# Patient Record
Sex: MALE | Race: WHITE | NOT HISPANIC OR LATINO | Employment: OTHER | URBAN - METROPOLITAN AREA
[De-identification: names, ages, dates, MRNs, and addresses within clinical notes are randomized per-mention and may not be internally consistent; named-entity substitution may affect disease eponyms.]

---

## 2017-04-01 ENCOUNTER — ALLSCRIPTS OFFICE VISIT (OUTPATIENT)
Dept: OTHER | Facility: OTHER | Age: 62
End: 2017-04-01

## 2017-04-01 DIAGNOSIS — Z12.11 ENCOUNTER FOR SCREENING FOR MALIGNANT NEOPLASM OF COLON: ICD-10-CM

## 2017-12-05 ENCOUNTER — LAB REQUISITION (OUTPATIENT)
Dept: LAB | Facility: HOSPITAL | Age: 62
End: 2017-12-05
Payer: COMMERCIAL

## 2017-12-05 DIAGNOSIS — R07.9 CHEST PAIN: ICD-10-CM

## 2017-12-05 DIAGNOSIS — E55.9 VITAMIN D DEFICIENCY: ICD-10-CM

## 2017-12-05 DIAGNOSIS — E78.5 HYPERLIPIDEMIA: ICD-10-CM

## 2017-12-05 DIAGNOSIS — Z12.5 ENCOUNTER FOR SCREENING FOR MALIGNANT NEOPLASM OF PROSTATE: ICD-10-CM

## 2017-12-05 LAB
25(OH)D3 SERPL-MCNC: 14.9 NG/ML (ref 30–100)
ALBUMIN SERPL BCP-MCNC: 3.8 G/DL (ref 3.5–5)
ALP SERPL-CCNC: 93 U/L (ref 46–116)
ALT SERPL W P-5'-P-CCNC: 19 U/L (ref 12–78)
ANION GAP SERPL CALCULATED.3IONS-SCNC: 4 MMOL/L (ref 4–13)
AST SERPL W P-5'-P-CCNC: 10 U/L (ref 5–45)
BASOPHILS # BLD AUTO: 0.02 THOUSANDS/ΜL (ref 0–0.1)
BASOPHILS NFR BLD AUTO: 1 % (ref 0–1)
BILIRUB SERPL-MCNC: 0.49 MG/DL (ref 0.2–1)
BUN SERPL-MCNC: 18 MG/DL (ref 5–25)
CALCIUM SERPL-MCNC: 8.9 MG/DL (ref 8.3–10.1)
CHLORIDE SERPL-SCNC: 106 MMOL/L (ref 100–108)
CHOLEST SERPL-MCNC: 240 MG/DL (ref 50–200)
CO2 SERPL-SCNC: 29 MMOL/L (ref 21–32)
CREAT SERPL-MCNC: 1.14 MG/DL (ref 0.6–1.3)
EOSINOPHIL # BLD AUTO: 0.08 THOUSAND/ΜL (ref 0–0.61)
EOSINOPHIL NFR BLD AUTO: 2 % (ref 0–6)
ERYTHROCYTE [DISTWIDTH] IN BLOOD BY AUTOMATED COUNT: 13.2 % (ref 11.6–15.1)
GFR SERPL CREATININE-BSD FRML MDRD: 68 ML/MIN/1.73SQ M
GLUCOSE P FAST SERPL-MCNC: 99 MG/DL (ref 65–99)
HCT VFR BLD AUTO: 44.6 % (ref 36.5–49.3)
HDLC SERPL-MCNC: 49 MG/DL (ref 40–60)
HGB BLD-MCNC: 15.5 G/DL (ref 12–17)
LDLC SERPL CALC-MCNC: 149 MG/DL (ref 0–100)
LYMPHOCYTES # BLD AUTO: 1.28 THOUSANDS/ΜL (ref 0.6–4.47)
LYMPHOCYTES NFR BLD AUTO: 31 % (ref 14–44)
MCH RBC QN AUTO: 32 PG (ref 26.8–34.3)
MCHC RBC AUTO-ENTMCNC: 34.8 G/DL (ref 31.4–37.4)
MCV RBC AUTO: 92 FL (ref 82–98)
MONOCYTES # BLD AUTO: 0.37 THOUSAND/ΜL (ref 0.17–1.22)
MONOCYTES NFR BLD AUTO: 9 % (ref 4–12)
NEUTROPHILS # BLD AUTO: 2.36 THOUSANDS/ΜL (ref 1.85–7.62)
NEUTS SEG NFR BLD AUTO: 57 % (ref 43–75)
NRBC BLD AUTO-RTO: 0 /100 WBCS
PLATELET # BLD AUTO: 204 THOUSANDS/UL (ref 149–390)
PMV BLD AUTO: 10.5 FL (ref 8.9–12.7)
POTASSIUM SERPL-SCNC: 4.6 MMOL/L (ref 3.5–5.3)
PROT SERPL-MCNC: 7 G/DL (ref 6.4–8.2)
PSA SERPL-MCNC: 5.4 NG/ML (ref 0–4)
RBC # BLD AUTO: 4.84 MILLION/UL (ref 3.88–5.62)
SODIUM SERPL-SCNC: 139 MMOL/L (ref 136–145)
TRIGL SERPL-MCNC: 208 MG/DL
TSH SERPL DL<=0.05 MIU/L-ACNC: 2.19 UIU/ML (ref 0.36–3.74)
WBC # BLD AUTO: 4.12 THOUSAND/UL (ref 4.31–10.16)

## 2017-12-05 PROCEDURE — 80061 LIPID PANEL: CPT | Performed by: FAMILY MEDICINE

## 2017-12-05 PROCEDURE — 84443 ASSAY THYROID STIM HORMONE: CPT | Performed by: FAMILY MEDICINE

## 2017-12-05 PROCEDURE — G0103 PSA SCREENING: HCPCS | Performed by: FAMILY MEDICINE

## 2017-12-05 PROCEDURE — 82306 VITAMIN D 25 HYDROXY: CPT | Performed by: FAMILY MEDICINE

## 2017-12-05 PROCEDURE — 85025 COMPLETE CBC W/AUTO DIFF WBC: CPT | Performed by: FAMILY MEDICINE

## 2017-12-05 PROCEDURE — 80053 COMPREHEN METABOLIC PANEL: CPT | Performed by: FAMILY MEDICINE

## 2017-12-18 ENCOUNTER — ALLSCRIPTS OFFICE VISIT (OUTPATIENT)
Dept: OTHER | Facility: OTHER | Age: 62
End: 2017-12-18

## 2018-01-04 ENCOUNTER — ALLSCRIPTS OFFICE VISIT (OUTPATIENT)
Dept: OTHER | Facility: OTHER | Age: 63
End: 2018-01-04

## 2018-01-04 DIAGNOSIS — R97.20 ELEVATED PROSTATE SPECIFIC ANTIGEN (PSA): ICD-10-CM

## 2018-01-05 NOTE — CONSULTS
Assessment   1  Elevated PSA (790 93) (R97 20)    Plan    Elevated PSA    · (1) PSA, DIAGNOSTIC (FOLLOW-UP); Status:Hold For - Exact Date; Requested    for:Approx [id number]; Perform:[facility]; Last Updated By:Taylor Ramirez; 1/4/2018 11:23:13 AM;Ordered;For:Elevated PSA; Ordered By:Sandhya Martínez; Follow-up visit in 1 month Evaluation and Treatment  Follow-up  Status: Hold For - Scheduling  Requested for: [id number]     Ordered; For: Elevated PSA; Ordered By: Clive Armenta  Performed:   Due: [id number]       Discussion/Summary   Discussion Summary:    We discussed implications of elevated PSA  He understands that there are many potential etiologies for this  One of which of course is prostate cancer  I recommend repeat PSA to evaluate for fluctuation versus sustained elevation  If PSA elevation is Systane, would recommend prostate biopsy to rule out cancer  If it returns to his baseline, may continue with routine prostate screening  and his wife understand and agree with the plan  Patient's Capacity to Self-Care: Patient is able to Self-Care  Self Referrals:    Self Referrals: No Dr Sylvie Quach      Chief Complaint   Chief Complaint Free Text Note Form: pt here for elevated PSA  5 4 (12/5/17)      History of Present Illness   HPI: Patient presents today for evaluation of elevated PSA to 5 4  Previous PSA level 3 7 in February 2016  He denies any history of prostate problems  He denies any urinary tract symptoms  No pertinent family history  Review of Systems   Complete-Male Urology:      Constitutional: No fever or chills, feels well, no tiredness, no recent weight gain or weight loss  Respiratory: No complaints of shortness of breath, no wheezing, no cough, no SOB on exertion, no orthopnea or PND  Cardiovascular: No complaints of slow heart rate, no fast heart rate, no chest pain, no palpitations, no leg claudication, no lower extremity        Gastrointestinal: No complaints of abdominal pain, no constipation, no nausea or vomiting, no diarrhea or bloody stools  Genitourinary: Empty sensation-- and-- stream quality good, but-- as noted in HPI,-- no dysuria,-- no urinary hesitancy,-- no hematuria,-- no incontinence,-- no nocturia-- and-- no feelings of urinary urgency  Musculoskeletal: No complaints of arthralgia, no myalgias, no joint swelling or stiffness, no limb pain or swelling  Integumentary: No complaints of skin rash or skin lesions, no itching, no skin wound, no dry skin  Hematologic/Lymphatic: No complaints of swollen glands, no swollen glands in the neck, does not bleed easily, no easy bruising  Neurological: No compliants of headache, no confusion, no convulsions, no numbness or tingling, no dizziness or fainting, no limb weakness, no difficulty walking  ROS Reviewed:    ROS reviewed  Active Problems   1  Elevated PSA (790 93) (R97 20)   2  Encounter for prostate cancer screening (V76 44) (Z12 5)   3  Flu vaccine need (V04 81) (Z23)   4  Hyperlipidemia (272 4) (E78 5)   5  Screen for colon cancer (V76 51) (Z12 11)   6  Vitamin D deficiency (268 9) (E55 9)    Past Medical History   1  History of Colon cancer screening (V76 51) (Z12 11)   2  History of Lipid screening (V77 91) (Z13 220)   3  History of Need for Tdap vaccination (V06 1) (Z23)   4  History of Other fatigue (780 79) (R53 83)   5  History of Screening for colon cancer (V76 51) (Z12 11)   6  History of Screening for prostate cancer (V76 44) (Z12 5)  Active Problems And Past Medical History Reviewed: The active problems and past medical history were reviewed and updated today  Surgical History   Surgical History Reviewed: The surgical history was reviewed and updated today  Family History   Mother    1  Family history of   Father    2  Family history of    3  Family history of cardiac disorder (V17 49) (Z82 49)  Sister    4   Family history of Heart palpitations  Family History Reviewed: The family history was reviewed and updated today  Social History    · Always uses seat belt   · Daily caffeine consumption, 2-3 servings a day   · Feels safe at home   · Former smoker (S95 54) (W12 170)   · Social alcohol use (Z78 9)  Social History Reviewed: The social history was reviewed and updated today  Current Meds    1  Glucosamine-Chondroitin Oral Capsule; Therapy: [id number] to Recorded   2  Vitamin C 500 MG Oral Capsule; Therapy: 55AZS1796 to Recorded   3  Vitamin D3 2000 UNIT Oral Tablet; Take 1 tablet daily; Therapy: 25Skn2984 to (Last Rx:15Bxw3058) Ordered  Medication List Reviewed: The medication list was reviewed and updated today  Allergies   1  No Known Drug Allergies    Vitals   Vital Signs    Recorded: [id number] 10:50AM   Heart Rate 80   Systolic 489   Diastolic 88   Height 5 ft 9 in   Weight 163 lb    BMI Calculated 24 07   BSA Calculated 1 89     Physical Exam        Constitutional      General appearance: No acute distress, well appearing and well nourished  Pulmonary      Respiratory effort: No increased work of breathing or signs of respiratory distress  Cardiovascular      Examination of extremities for edema and/or varicosities: Normal        Abdomen      Abdomen: Non-tender, no masses  Genitourinary      Digital rectal exam of prostate: Normal size, no masses  -- 40 gram, smooth, no nodules  Musculoskeletal      Gait and station: Normal        Skin      Skin and subcutaneous tissue: Normal without rashes or lesions  Lymphatic      Palpation of lymph nodes in groin: Normal        Neurologic      Sensation: No sensory loss  Future Appointments      Date/Time Provider Specialty Site   06/19/2018 09:10 AM Gisele Soto Nurse Schedule  [facility] [place]   02/01/2018 10:00 AM SOO Dasilva   Urology [facility]  [place]     Signatures    Electronically signed by : SOO Galvin ; Jan 4 2018  2:33PM EST                       (Author)

## 2018-01-09 ENCOUNTER — GENERIC CONVERSION - ENCOUNTER (OUTPATIENT)
Dept: OTHER | Facility: OTHER | Age: 63
End: 2018-01-09

## 2018-01-12 NOTE — RESULT NOTES
Verified Results  (1) LIPID PANEL, FASTING [id number] 12:00AM Yarelis Wright Order Number: [id number]      Triglyceride:         Normal              <150 mg/dl       Borderline High    150-199 mg/dl       High               200-499 mg/dl       Very High          >499 mg/dl  Cholesterol:         Desirable        <200 mg/dl      Borderline High  200-239 mg/dl      High             >239 mg/dl  HDL Cholesterol:        High    >59 mg/dL      Low     <41 mg/dL  LDL CALCULATED:    This screening LDL is a calculated result  It does not have the accuracy of the Direct Measured LDL in the monitoring of patients with hyperlipidemia and/or statin therapy  Direct Measure LDL (DAY741) must be ordered separately in these patients       Test Name Result Flag Reference   CHOLESTEROL 274 mg/dL H    HDL,DIRECT 54 mg/dL  40-60   LDL CHOLESTEROL CALCULATED 178 mg/dL H 0-100   TRIGLYCERIDES 209 mg/dL H <=150

## 2018-01-13 VITALS
HEART RATE: 86 BPM | WEIGHT: 160.44 LBS | HEIGHT: 69 IN | TEMPERATURE: 98.2 F | SYSTOLIC BLOOD PRESSURE: 140 MMHG | DIASTOLIC BLOOD PRESSURE: 100 MMHG | RESPIRATION RATE: 16 BRPM | BODY MASS INDEX: 23.76 KG/M2 | OXYGEN SATURATION: 98 %

## 2018-01-23 VITALS
HEART RATE: 80 BPM | BODY MASS INDEX: 24.14 KG/M2 | WEIGHT: 163 LBS | HEIGHT: 69 IN | SYSTOLIC BLOOD PRESSURE: 142 MMHG | DIASTOLIC BLOOD PRESSURE: 88 MMHG

## 2018-01-23 VITALS
WEIGHT: 160.5 LBS | BODY MASS INDEX: 23.77 KG/M2 | SYSTOLIC BLOOD PRESSURE: 126 MMHG | HEART RATE: 99 BPM | OXYGEN SATURATION: 99 % | HEIGHT: 69 IN | TEMPERATURE: 96.5 F | DIASTOLIC BLOOD PRESSURE: 90 MMHG | RESPIRATION RATE: 17 BRPM

## 2018-01-23 NOTE — PROGRESS NOTES
Assessment    1  Encounter for preventive health examination (V70 0) (Z00 00)    Plan  Flu vaccine need    · Stop: Fluzone Quadrivalent Intramuscular Suspension  Hyperlipidemia, Vitamin D deficiency    · (1) COMPREHENSIVE METABOLIC PANEL; Status:Active; Requested PAV:[id number];    · (1) LIPID PANEL, FASTING; Status:Active; Requested ISAIAH:[id number];    · (1) VITAMIN D 25-HYDROXY; Status:Active; Requested QMV:[id number];   Screen for colon cancer    · COLONOSCOPY; Status:Temporary Deferral - Pt refuses;    12/18/2018    Discussion/Summary  health maintenance visit Impression: healthy adult male  Currently, he eats a poor diet and has an inadequate exercise regimen  Prostate cancer screening: the risks and benefits of prostate cancer screening were discussed and prostate cancer screening is current  Colorectal cancer screening: the risks and benefits of colorectal cancer screening were discussed and the patient declines colorectal cancer screening  The risks and benefits of immunizations were discussed, immunizations are up to date and Patient defers flu shot  Advice and education were given regarding nutrition, aerobic exercise, cardiovascular risk reduction and alcohol use  Patient discussion: discussed with the patient  Patient doing fairly well overall from a physical standpoint  We discussed his fasting blood work results in detail which include a total cholesterol of 240 and an LDL of 149 which are both abnormal but are actually improved overall from previous fasting blood work done in February 2016  I counseled him regarding diet, exercise, and lifestyle changes that will help to further lower these numbers and I also gave him some patient information regarding this and recommended a trial of red yeast rice 1200 milligrams once daily with a meal over-the-counter  We can Re assess his lipids in 6 months  I am concerned about his elevated PSA of 5 4 as his last PSA level last year was 3 7   I explained that not only an elevated PSA but the rate of rise the PSA can suggest increased risk for prostate cancer so I will refer him to Urology for consult  The rest of his labs look okay  He is doing fairly well overall and just needs to make some tweaks to his lifestyle in order to get his numbers were the need to be  His vitamin-D level is also low and his wife takes vitamin-D supplement so he is going to start taking the same supplement daily we will reassess his vitamin-D level in 6 months  Since he is doing well overall, he only needs to be seen in the office once yearly but needs to obtain blood work every 6 months and he is agreeable to this  The treatment plan was reviewed with the patient/guardian  The patient/guardian understands and agrees with the treatment plan      Chief Complaint  pt here for his yearly physical      History of Present Illness  HM, Adult Male: The patient is being seen for a health maintenance evaluation  General Health: The patient's health since the last visit is described as good  He has regular dental visits  He complains of vision problems  Vision care includes wearing glasses  He has hearing loss  hearing is slightly decreased   He doesn't wear a hearing aid  Lifestyle:  He does not have a healthy diet  He does not have any weight concerns  He does not exercise regularly  He does not use tobacco  He consumes alcohol  He denies drug use  Reproductive health:  He denies erectile dysfunction  Screening: Additional History:  Declines colonoscopy; UTD with PSA  HPI: Patient presents for annual physical and to review his recent fasting blood work results  He is feeling well today without complaints  Review of Systems    Constitutional: No fever or chills, feels well, no tiredness, no recent weight gain or weight loss  Eyes: No complaints of eye pain, no red eyes, no discharge from eyes, no itchy eyes     ENT: no complaints of earache, no hearing loss, no nosebleeds, no nasal discharge, no sore throat, no hoarseness  Cardiovascular: No complaints of slow heart rate, no fast heart rate, no chest pain, no palpitations, no leg claudication, no lower extremity  Respiratory: No complaints of shortness of breath, no wheezing, no cough, no SOB on exertion, no orthopnea or PND  Gastrointestinal: No complaints of abdominal pain, no constipation, no nausea or vomiting, no diarrhea or bloody stools  Genitourinary: No complaints of dysuria, no incontinence, no hesitancy, no nocturia, no genital lesion, no testicular pain  Musculoskeletal: No complaints of arthralgia, no myalgias, no joint swelling or stiffness, no limb pain or swelling  Integumentary: No complaints of skin rash or skin lesions, no itching, no skin wound, no dry skin  Neurological: No compliants of headache, no confusion, no convulsions, no numbness or tingling, no dizziness or fainting, no limb weakness, no difficulty walking  Psychiatric: Is not suicidal, no sleep disturbances, no anxiety or depression, no change in personality, no emotional problems  Endocrine: No complaints of proptosis, no hot flashes, no muscle weakness, no erectile dysfunction, no deepening of the voice, no feelings of weakness  Hematologic/Lymphatic: No complaints of swollen glands, no swollen glands in the neck, does not bleed easily, no easy bruising  Active Problems    1  Encounter for prostate cancer screening (V76 44) (Z12 5)   2  Hyperlipidemia (272 4) (E78 5)   3   Vitamin D deficiency (268 9) (E55 9)    Past Medical History    · History of Colon cancer screening (V76 51) (Z12 11)   · History of Lipid screening (V77 91) (Z13 220)   · History of Need for Tdap vaccination (V06 1) (Z23)   · History of Other fatigue (780 79) (R53 83)   · History of Screening for colon cancer (V76 51) (Z12 11)   · History of Screening for prostate cancer (V76 44) (Z12 5)    Family History  Mother    · Family history of   Father    · Family history of    · Family history of cardiac disorder (V17 49) (Z80 55)  Sister    · Family history of Heart palpitations    Social History    · Former smoker (W50 19) (T39 031)   · Social alcohol use (Z78 9)    Current Meds   1  Glucosamine-Chondroitin Oral Capsule; Therapy: [id number] to Recorded   2  Meloxicam 15 MG Oral Tablet; TAKE 1 TABLET DAILY WITH FOOD AS NEEDED; Therapy: 2017 to (Loretta Tolentino)  Requested for: 2017; Last   Rx:2017 Ordered   3  Vitamin C 500 MG Oral Capsule; Therapy: [id number] to Recorded    Allergies    1  No Known Drug Allergies    Vitals   Recorded: [id number] 10:17AM Recorded: 84WBW6373 09:32AM   Temperature  96 5 F, Tympanic   Heart Rate  99   Pulse Quality  Regular   Respiration Quality  Normal   Respiration  17   Systolic 035 065, RUE, Sitting   Diastolic 90 90, RUE, Sitting   Height  5 ft 9 in   Weight  160 lb 8 oz   BMI Calculated  23 7   BSA Calculated  1 88   O2 Saturation  99   Pain Scale  0     Physical Exam    Constitutional   General appearance: No acute distress, well appearing and well nourished  Vital signs reviewed  Head and Face   Head and face: Normal     Eyes   Conjunctiva and lids: No erythema, swelling or discharge  Pupils and irises: Equal, round, reactive to light  Ears, Nose, Mouth, and Throat   External inspection of ears and nose: Normal     Otoscopic examination: Tympanic membranes translucent with normal light reflex  Canals patent without erythema  Hearing: Normal     Lips, teeth, and gums: Normal, good dentition  Oropharynx: Normal with no erythema, edema, exudate or lesions  Neck   Neck: Supple, symmetric, trachea midline, no masses  Thyroid: Normal, no thyromegaly  Pulmonary   Respiratory effort: No increased work of breathing or signs of respiratory distress  Auscultation of lungs: Clear to auscultation  Cardiovascular   Auscultation of heart: Normal rate and rhythm, normal S1 and S2, no murmurs  Carotid pulses: 2+ bilaterally  Chest   Chest: Normal     Abdomen   Abdomen: Non-tender, no masses  Liver and spleen: No hepatomegaly or splenomegaly  Examination for hernias: No hernias appreciated  Genitourinary   Scrotal contents: Normal testes, no masses  Lymphatic   Palpation of lymph nodes in neck: No lymphadenopathy  Musculoskeletal   Gait and station: Normal     Inspection/palpation of digits and nails: Normal without clubbing or cyanosis  Inspection/palpation of joints, bones, and muscles: Normal     Range of motion: Normal     Stability: Normal     Muscle strength/tone: Normal     Skin   Skin and subcutaneous tissue: Normal without rashes or lesions  Neurologic   Cranial nerves: Cranial nerves 2-12 intact  Cortical function: Normal mental status  Reflexes: 2+ and symmetric  Sensation: No sensory loss  Coordination: Normal finger to nose and heel to shin  Psychiatric   Orientation to person, place and time: Normal     Mood and affect: Normal        Results/Data  (1) CBC/PLT/DIFF [id number] 11:31AM Dk Keller    Order Number: [id number]     Test Name Result Flag Reference   WBC COUNT 4 12 Thousand/uL L 4 31-10 16   RBC COUNT 4 84 Million/uL  3 88-5 62   HEMOGLOBIN 15 5 g/dL  12 0-17 0   HEMATOCRIT 44 6 %  36 5-49 3   MCV 92 fL  82-98   MCH 32 0 pg  26 8-34 3   MCHC 34 8 g/dL  31 4-37 4   RDW 13 2 %  11 6-15 1   MPV 10 5 fL  8 9-12 7   PLATELET COUNT 278 Thousands/uL  149-390   nRBC AUTOMATED 0 /100 WBCs     NEUTROPHILS RELATIVE PERCENT 57 %  43-75   LYMPHOCYTES RELATIVE PERCENT 31 %  14-44   MONOCYTES RELATIVE PERCENT 9 %  4-12   EOSINOPHILS RELATIVE PERCENT 2 %  0-6   BASOPHILS RELATIVE PERCENT 1 %  0-1   NEUTROPHILS ABSOLUTE COUNT 2 36 Thousands/? ??L  1 85-7 62   LYMPHOCYTES ABSOLUTE COUNT 1 28 Thousands/? ??L  0 60-4 47   MONOCYTES ABSOLUTE COUNT 0 37 Thousand/? ??L  0 17-1 22   EOSINOPHILS ABSOLUTE COUNT 0 08 Thousand/? ??L  0 00-0 61   BASOPHILS ABSOLUTE COUNT 0 02 Thousands/? ??L  0 00-0 10   WBC COUNT 4 12 Thousand/uL L 4 31-10 16   RBC COUNT 4 84 Million/uL  3 88-5 62   HEMOGLOBIN 15 5 g/dL  12 0-17 0   HEMATOCRIT 44 6 %  36 5-49 3   MCV 92 fL  82-98   MCH 32 0 pg  26 8-34 3   MCHC 34 8 g/dL  31 4-37 4   RDW 13 2 %  11 6-15 1   MPV 10 5 fL  8 9-12 7   PLATELET COUNT 884 Thousands/uL  149-390   nRBC AUTOMATED 0 /100 WBCs     NEUTROPHILS RELATIVE PERCENT 57 %  43-75   LYMPHOCYTES RELATIVE PERCENT 31 %  14-44   MONOCYTES RELATIVE PERCENT 9 %  4-12   EOSINOPHILS RELATIVE PERCENT 2 %  0-6   BASOPHILS RELATIVE PERCENT 1 %  0-1   NEUTROPHILS ABSOLUTE COUNT 2 36 Thousands/? ??L  1 85-7 62   LYMPHOCYTES ABSOLUTE COUNT 1 28 Thousands/? ??L  0 60-4 47   MONOCYTES ABSOLUTE COUNT 0 37 Thousand/? ??L  0 17-1 22   EOSINOPHILS ABSOLUTE COUNT 0 08 Thousand/? ??L  0 00-0 61   BASOPHILS ABSOLUTE COUNT 0 02 Thousands/? ??L  0 00-0 10                 (1) COMPREHENSIVE METABOLIC PANEL [id number] 02:31CS Angelo Machuca    Order Number: [id number]     Test Name Result Flag Reference   SODIUM 139 mmol/L  136-145   POTASSIUM 4 6 mmol/L  3 5-5 3   CHLORIDE 106 mmol/L  100-108   CARBON DIOXIDE 29 mmol/L  21-32   ANION GAP (CALC) 4 mmol/L  4-13   BLOOD UREA NITROGEN 18 mg/dL  5-25   CREATININE 1 14 mg/dL  0 60-1 30   Standardized to IDMS reference method   CALCIUM 8 9 mg/dL  8 3-10 1   BILI, TOTAL 0 49 mg/dL  0 20-1 00   ALK PHOSPHATAS 93 U/L     ALT (SGPT) 19 U/L  12-78   Specimen collection should occur prior to Sulfasalazine and/or Sulfapyridine administration due to the potential for falsely depressed results  AST(SGOT) 10 U/L  5-45   Specimen collection should occur prior to Sulfasalazine administration due to the potential for falsely depressed results     ALBUMIN 3 8 g/dL  3 5-5 0   TOTAL PROTEIN 7 0 g/dL  6 4-8 2   eGFR 68 ml/min/1 73sq m     National Kidney Disease Education Program recommendations are as follows:  GFR calculation is accurate only with a steady state creatinine  Chronic Kidney disease less than 60 ml/min/1 73 sq  meters  Kidney failure less than 15 ml/min/1 73 sq  meters  GLUCOSE FASTING 99 mg/dL  65-99   Specimen collection should occur prior to Sulfasalazine administration due to the potential for falsely depressed results  Specimen collection should occur prior to Sulfapyridine administration due to the potential for falsely elevated results  SODIUM 139 mmol/L  136-145   POTASSIUM 4 6 mmol/L  3 5-5 3   CHLORIDE 106 mmol/L  100-108   CARBON DIOXIDE 29 mmol/L  21-32   ANION GAP (CALC) 4 mmol/L  4-13   BLOOD UREA NITROGEN 18 mg/dL  5-25   CREATININE 1 14 mg/dL  0 60-1 30   Standardized to IDMS reference method   CALCIUM 8 9 mg/dL  8 3-10 1   BILI, TOTAL 0 49 mg/dL  0 20-1 00   ALK PHOSPHATAS 93 U/L     ALT (SGPT) 19 U/L  12-78   Specimen collection should occur prior to Sulfasalazine and/or Sulfapyridine administration due to the potential for falsely depressed results  AST(SGOT) 10 U/L  5-45   Specimen collection should occur prior to Sulfasalazine administration due to the potential for falsely depressed results  ALBUMIN 3 8 g/dL  3 5-5 0   TOTAL PROTEIN 7 0 g/dL  6 4-8 2   eGFR 68 ml/min/1 73sq m     National Kidney Disease Education Program recommendations are as follows:  GFR calculation is accurate only with a steady state creatinine  Chronic Kidney disease less than 60 ml/min/1 73 sq  meters  Kidney failure less than 15 ml/min/1 73 sq  meters  GLUCOSE FASTING 99 mg/dL  65-99   Specimen collection should occur prior to Sulfasalazine administration due to the potential for falsely depressed results  Specimen collection should occur prior to Sulfapyridine administration due to the potential for falsely elevated results                   (1) LIPID PANEL FASTING W DIRECT LDL REFLEX [id number] 11:31AM [place] Call Order Number: [id number]     Test Name Result Flag Reference   CHOLESTEROL 240 mg/dL H    LDL CHOLESTEROL CALCULATED 149 mg/dL H 0-100   Triglyceride:        Normal <150 mg/dl   Borderline High 150-199 mg/dl   High 200-499 mg/dl   Very High >499 mg/dl      Cholesterol:       Desirable <200 mg/dl    Borderline High 200-239 mg/dl    High >239 mg/dl      HDL Cholesterol:       High>59 mg/dL    Low <41 mg/dL      HDL Cholesterol:       High>59 mg/dL    Low <41 mg/dL      This screening LDL is a calculated result  It does not have the accuracy of the Direct Measured LDL in the monitoring of patients with hyperlipidemia and/or statin therapy  Direct Measure LDL (RWL322) must be ordered separately in these patients  TRIGLYCERIDES 208 mg/dL H <=150   Specimen collection should occur prior to N-Acetylcysteine or Metamizole administration due to the potential for falsely depressed results  HDL,DIRECT 49 mg/dL  40-60   Specimen collection should occur prior to Metamizole administration due to the potential for falsley depressed results  CHOLESTEROL 240 mg/dL H    LDL CHOLESTEROL CALCULATED 149 mg/dL H 0-100   Triglyceride:        Normal <150 mg/dl   Borderline High 150-199 mg/dl   High 200-499 mg/dl   Very High >499 mg/dl      Cholesterol:       Desirable <200 mg/dl    Borderline High 200-239 mg/dl    High >239 mg/dl      HDL Cholesterol:       High>59 mg/dL    Low <41 mg/dL      HDL Cholesterol:       High>59 mg/dL    Low <41 mg/dL      This screening LDL is a calculated result  It does not have the accuracy of the Direct Measured LDL in the monitoring of patients with hyperlipidemia and/or statin therapy  Direct Measure LDL (TXH932) must be ordered separately in these patients  TRIGLYCERIDES 208 mg/dL H <=150   Specimen collection should occur prior to N-Acetylcysteine or Metamizole administration due to the potential for falsely depressed results  HDL,DIRECT 49 mg/dL  40-60   Specimen collection should occur prior to Metamizole administration due to the potential for falsley depressed results  (1) PSA (SCREEN) (Dx V76 44 Screen for Prostate Cancer) [id number] 11:31AM Mary Saunders    Order Number: [id number]     Test Name Result Flag Reference   PROSTATE SPECIFIC ANTIGEN 5 4 ng/mL H 0 0-4 0   American Urological Association Guidelines define biochemical recurrence of prostate cancer as a detectable or rising PSA value post-radical prostatectomy that is greater than or equal to 0 2 ng/mL with a second confirmatory level of greater than or equal to 0 2 ng/mL  (1) TSH WITH FT4 REFLEX [id number] 11:31AM Mary Saunders    Order Number: [id number]     Test Name Result Flag Reference   TSH 2 190 uIU/mL  0 358-3 740   Patients undergoing fluorescein dye angiography may retain small amounts of fluorescein in the body for 48-72 hours post procedure  Samples containing fluorescein can produce falsely depressed TSH values  If the patient had this procedure,a specimen should be resubmitted post fluorescein clearance  (1) VITAMIN D 25-HYDROXY [id number] 11:31AM Mary Saunders    Order Number: [id number]     Test Name Result Flag Reference   VIT D 25-HYDROX 14 9 ng/mL L 30 0-100 0   This assay is a certified procedure of the CDC Vitamin D Standardization Certification Program (VDSCP)     Deficiency <20ng/ml   Insufficiency 20-30ng/ml   Sufficient  ng/ml     *Patients undergoing fluorescein dye angiography may retain small amounts of fluorescein in the body for 48-72 hours post procedure  Samples containing fluorescein can produce falsely elevated Vitamin D values  If the patient had this procedure, a specimen should be resubmitted post fluorescein clearance  Future Appointments    Date/Time Provider Specialty Site   06/19/2018 09:10 AM [place], Nurse Schedule  [address]   01/04/2018 10:45 AM SOO Shannon   Urology [facility]     Signatures   Electronically signed by :  Angie Almazan, [facility]; Dec 21 2017  6:18PM EST                       (Author)    Electronically signed by : Chayito Singer DO; Dec 22 2017 11:25AM EST

## 2018-01-23 NOTE — RESULT NOTES
Verified Results  (1) CBC/PLT/DIFF [id number] 11:31AM Mary Saunders   TW Order Number: [id number]     Test Name Result Flag Reference   WBC COUNT 4 12 Thousand/uL L 4 31-10 16   RBC COUNT 4 84 Million/uL  3 88-5 62   HEMOGLOBIN 15 5 g/dL  12 0-17 0   HEMATOCRIT 44 6 %  36 5-49 3   MCV 92 fL  82-98   MCH 32 0 pg  26 8-34 3   MCHC 34 8 g/dL  31 4-37 4   RDW 13 2 %  11 6-15 1   MPV 10 5 fL  8 9-12 7   PLATELET COUNT 608 Thousands/uL  149-390   nRBC AUTOMATED 0 /100 WBCs     NEUTROPHILS RELATIVE PERCENT 57 %  43-75   LYMPHOCYTES RELATIVE PERCENT 31 %  14-44   MONOCYTES RELATIVE PERCENT 9 %  4-12   EOSINOPHILS RELATIVE PERCENT 2 %  0-6   BASOPHILS RELATIVE PERCENT 1 %  0-1   NEUTROPHILS ABSOLUTE COUNT 2 36 Thousands/? ??L  1 85-7 62   LYMPHOCYTES ABSOLUTE COUNT 1 28 Thousands/? ??L  0 60-4 47   MONOCYTES ABSOLUTE COUNT 0 37 Thousand/? ??L  0 17-1 22   EOSINOPHILS ABSOLUTE COUNT 0 08 Thousand/? ??L  0 00-0 61   BASOPHILS ABSOLUTE COUNT 0 02 Thousands/? ??L  0 00-0 10   WBC COUNT 4 12 Thousand/uL L 4 31-10 16   RBC COUNT 4 84 Million/uL  3 88-5 62   HEMOGLOBIN 15 5 g/dL  12 0-17 0   HEMATOCRIT 44 6 %  36 5-49 3   MCV 92 fL  82-98   MCH 32 0 pg  26 8-34 3   MCHC 34 8 g/dL  31 4-37 4   RDW 13 2 %  11 6-15 1   MPV 10 5 fL  8 9-12 7   PLATELET COUNT 612 Thousands/uL  149-390   nRBC AUTOMATED 0 /100 WBCs     NEUTROPHILS RELATIVE PERCENT 57 %  43-75   LYMPHOCYTES RELATIVE PERCENT 31 %  14-44   MONOCYTES RELATIVE PERCENT 9 %  4-12   EOSINOPHILS RELATIVE PERCENT 2 %  0-6   BASOPHILS RELATIVE PERCENT 1 %  0-1   NEUTROPHILS ABSOLUTE COUNT 2 36 Thousands/? ??L  1 85-7 62   LYMPHOCYTES ABSOLUTE COUNT 1 28 Thousands/? ??L  0 60-4 47   MONOCYTES ABSOLUTE COUNT 0 37 Thousand/? ??L  0 17-1 22   EOSINOPHILS ABSOLUTE COUNT 0 08 Thousand/? ??L  0 00-0 61   BASOPHILS ABSOLUTE COUNT 0 02 Thousands/? ??L  0 00-0 10                 (1) COMPREHENSIVE METABOLIC PANEL [id number] 16:06ZM Mary DALEY Order Number: [id number]     Test Name Result Flag Reference   SODIUM 139 mmol/L  136-145   POTASSIUM 4 6 mmol/L  3 5-5 3   CHLORIDE 106 mmol/L  100-108   CARBON DIOXIDE 29 mmol/L  21-32   ANION GAP (CALC) 4 mmol/L  4-13   BLOOD UREA NITROGEN 18 mg/dL  5-25   CREATININE 1 14 mg/dL  0 60-1 30   Standardized to IDMS reference method   CALCIUM 8 9 mg/dL  8 3-10 1   BILI, TOTAL 0 49 mg/dL  0 20-1 00   ALK PHOSPHATAS 93 U/L     ALT (SGPT) 19 U/L  12-78   Specimen collection should occur prior to Sulfasalazine and/or Sulfapyridine administration due to the potential for falsely depressed results  AST(SGOT) 10 U/L  5-45   Specimen collection should occur prior to Sulfasalazine administration due to the potential for falsely depressed results  ALBUMIN 3 8 g/dL  3 5-5 0   TOTAL PROTEIN 7 0 g/dL  6 4-8 2   eGFR 68 ml/min/1 73sq m     National Kidney Disease Education Program recommendations are as follows:  GFR calculation is accurate only with a steady state creatinine  Chronic Kidney disease less than 60 ml/min/1 73 sq  meters  Kidney failure less than 15 ml/min/1 73 sq  meters  GLUCOSE FASTING 99 mg/dL  65-99   Specimen collection should occur prior to Sulfasalazine administration due to the potential for falsely depressed results  Specimen collection should occur prior to Sulfapyridine administration due to the potential for falsely elevated results  SODIUM 139 mmol/L  136-145   POTASSIUM 4 6 mmol/L  3 5-5 3   CHLORIDE 106 mmol/L  100-108   CARBON DIOXIDE 29 mmol/L  21-32   ANION GAP (CALC) 4 mmol/L  4-13   BLOOD UREA NITROGEN 18 mg/dL  5-25   CREATININE 1 14 mg/dL  0 60-1 30   Standardized to IDMS reference method   CALCIUM 8 9 mg/dL  8 3-10 1   BILI, TOTAL 0 49 mg/dL  0 20-1 00   ALK PHOSPHATAS 93 U/L     ALT (SGPT) 19 U/L  12-78   Specimen collection should occur prior to Sulfasalazine and/or Sulfapyridine administration due to the potential for falsely depressed results     AST(SGOT) 10 U/L  5-45   Specimen collection should occur prior to Sulfasalazine administration due to the potential for falsely depressed results  ALBUMIN 3 8 g/dL  3 5-5 0   TOTAL PROTEIN 7 0 g/dL  6 4-8 2   eGFR 68 ml/min/1 73sq m     National Kidney Disease Education Program recommendations are as follows:  GFR calculation is accurate only with a steady state creatinine  Chronic Kidney disease less than 60 ml/min/1 73 sq  meters  Kidney failure less than 15 ml/min/1 73 sq  meters  GLUCOSE FASTING 99 mg/dL  65-99   Specimen collection should occur prior to Sulfasalazine administration due to the potential for falsely depressed results  Specimen collection should occur prior to Sulfapyridine administration due to the potential for falsely elevated results  (1) LIPID PANEL FASTING W DIRECT LDL REFLEX [id number] 11:31AM Teri DALEY Order Number: [id number]     Test Name Result Flag Reference   CHOLESTEROL 240 mg/dL H    LDL CHOLESTEROL CALCULATED 149 mg/dL H 0-100   Triglyceride:        Normal <150 mg/dl   Borderline High 150-199 mg/dl   High 200-499 mg/dl   Very High >499 mg/dl      Cholesterol:       Desirable <200 mg/dl    Borderline High 200-239 mg/dl    High >239 mg/dl      HDL Cholesterol:       High>59 mg/dL    Low <41 mg/dL      HDL Cholesterol:       High>59 mg/dL    Low <41 mg/dL      This screening LDL is a calculated result  It does not have the accuracy of the Direct Measured LDL in the monitoring of patients with hyperlipidemia and/or statin therapy  Direct Measure LDL (XCW263) must be ordered separately in these patients  TRIGLYCERIDES 208 mg/dL H <=150   Specimen collection should occur prior to N-Acetylcysteine or Metamizole administration due to the potential for falsely depressed results  HDL,DIRECT 49 mg/dL  40-60   Specimen collection should occur prior to Metamizole administration due to the potential for falsley depressed results     CHOLESTEROL 240 mg/dL H    LDL CHOLESTEROL CALCULATED 149 mg/dL H 0-100   Triglyceride:        Normal <150 mg/dl   Borderline High 150-199 mg/dl   High 200-499 mg/dl   Very High >499 mg/dl      Cholesterol:       Desirable <200 mg/dl    Borderline High 200-239 mg/dl    High >239 mg/dl      HDL Cholesterol:       High>59 mg/dL    Low <41 mg/dL      HDL Cholesterol:       High>59 mg/dL    Low <41 mg/dL      This screening LDL is a calculated result  It does not have the accuracy of the Direct Measured LDL in the monitoring of patients with hyperlipidemia and/or statin therapy  Direct Measure LDL (CYN083) must be ordered separately in these patients  TRIGLYCERIDES 208 mg/dL H <=150   Specimen collection should occur prior to N-Acetylcysteine or Metamizole administration due to the potential for falsely depressed results  HDL,DIRECT 49 mg/dL  40-60   Specimen collection should occur prior to Metamizole administration due to the potential for falsley depressed results  (1) PSA (SCREEN) (Dx V76 44 Screen for Prostate Cancer) [id number] 11:31AM [employer]    Order Number: [id number]     Test Name Result Flag Reference   PROSTATE SPECIFIC ANTIGEN 5 4 ng/mL H 0 0-4 0   American Urological Association Guidelines define biochemical recurrence of prostate cancer as a detectable or rising PSA value post-radical prostatectomy that is greater than or equal to 0 2 ng/mL with a second confirmatory level of greater than or equal to 0 2 ng/mL  (1) TSH WITH FT4 REFLEX [id number] 11:31AM [employer] Order Number: [id number]     Test Name Result Flag Reference   TSH 2 190 uIU/mL  0 358-3 740   Patients undergoing fluorescein dye angiography may retain small amounts of fluorescein in the body for 48-72 hours post procedure  Samples containing fluorescein can produce falsely depressed TSH values  If the patient had this procedure,a specimen should be resubmitted post fluorescein clearance                               (1) VITAMIN D 25-HYDROXY [id number] 11:31AM Ulises Gunderson   TW Order Number: [id number]     Test Name Result Flag Reference   VIT D 25-HYDROX 14 9 ng/mL L 30 0-100 0   This assay is a certified procedure of the CDC Vitamin D Standardization Certification Program (VDSCP)     Deficiency <20ng/ml   Insufficiency 20-30ng/ml   Sufficient  ng/ml     *Patients undergoing fluorescein dye angiography may retain small amounts of fluorescein in the body for 48-72 hours post procedure  Samples containing fluorescein can produce falsely elevated Vitamin D values  If the patient had this procedure, a specimen should be resubmitted post fluorescein clearance

## 2018-01-26 ENCOUNTER — TRANSCRIBE ORDERS (OUTPATIENT)
Dept: ADMINISTRATIVE | Facility: HOSPITAL | Age: 63
End: 2018-01-26

## 2018-01-26 ENCOUNTER — APPOINTMENT (OUTPATIENT)
Dept: LAB | Facility: MEDICAL CENTER | Age: 63
End: 2018-01-26
Payer: COMMERCIAL

## 2018-01-26 DIAGNOSIS — R97.20 ELEVATED PROSTATE SPECIFIC ANTIGEN (PSA): ICD-10-CM

## 2018-01-26 LAB — PSA SERPL-MCNC: 4.3 NG/ML (ref 0–4)

## 2018-01-26 PROCEDURE — 84153 ASSAY OF PSA TOTAL: CPT

## 2018-01-26 PROCEDURE — 36415 COLL VENOUS BLD VENIPUNCTURE: CPT

## 2018-02-01 ENCOUNTER — OFFICE VISIT (OUTPATIENT)
Dept: UROLOGY | Facility: CLINIC | Age: 63
End: 2018-02-01
Payer: COMMERCIAL

## 2018-02-01 VITALS
HEART RATE: 60 BPM | HEIGHT: 69 IN | DIASTOLIC BLOOD PRESSURE: 68 MMHG | BODY MASS INDEX: 24.14 KG/M2 | SYSTOLIC BLOOD PRESSURE: 118 MMHG | WEIGHT: 163 LBS

## 2018-02-01 DIAGNOSIS — R97.20 ELEVATED PSA: Primary | ICD-10-CM

## 2018-02-01 PROCEDURE — 99213 OFFICE O/P EST LOW 20 MIN: CPT | Performed by: UROLOGY

## 2018-02-01 RX ORDER — ASCORBIC ACID 100 MG
TABLET,CHEWABLE ORAL
COMMUNITY
Start: 2016-02-09

## 2018-02-01 RX ORDER — BIOTIN 1 MG
1 TABLET ORAL DAILY
COMMUNITY
Start: 2017-12-21

## 2018-02-01 NOTE — PROGRESS NOTES
2/1/2018    Shannan Coffey  [birth year]  [id number]        Assessment  Elevated PSA    Plan  Patient was reassured that PSA has come back down to a reasonable level  Will continue observation cautiously to ensure that there is no persistent PSA elevation  Recommend repeat PSA and re-evaluation in 6 months  Patient understands and agrees with the plan  History of Present Illness  Silvia Pino is a 60 y o  male seen for elevated PSA  His baseline was 3 7, recently found to have PSA 5 4  I sent him for repeat PSA which is currently 4 3  He has no urinary complaints  AUA SYMPTOM SCORE    Flowsheet Row Most Recent Value   AUA SYMPTOM SCORE   How often have you had a sensation of not emptying your bladder completely after you finished urinating? 0   How often have you had to urinate again less than two hours after you finished urinating? 0   How often have you found you stopped and started again several times when you urinate?  0   How often have you found it difficult to postpone urination? 0   How often have you had a weak urinary stream?  0   How often have you had to push or strain to begin urination? 0   How many times did you most typically get up to urinate from the time you went to bed at night until the time you got up in the morning? 1   Quality of Life: If you were to spend the rest of your life with your urinary condition just the way it is now, how would you feel about that?  1   AUA SYMPTOM SCORE  1          Review of Systems  Review of Systems   Constitutional: Negative  HENT: Negative  Respiratory: Negative  Cardiovascular: Negative  Gastrointestinal: Negative  Genitourinary: Negative  Musculoskeletal: Negative  Skin: Negative  Neurological: Negative  Hematological: Negative            Past Medical History  Past Medical History:   Diagnosis Date    Colon cancer screening     Fatigue     Lipid screening     Need for Tdap vaccination     Screening for prostate cancer        Past Social History  History reviewed  No pertinent surgical history  Past Family History  Family History   Problem Relation Age of Onset    Heart disease Father        Past Social history  Social History     Social History    Marital status: /Civil Union     Spouse name: N/A    Number of children: N/A    Years of education: N/A     Occupational History    Not on file  Social History Main Topics    Smoking status: Former Smoker    Smokeless tobacco: Never Used    Alcohol use Yes      Comment: Social     Drug use: No    Sexual activity: Not on file     Other Topics Concern    Not on file     Social History Narrative    No narrative on file       Current Medications  Current Outpatient Prescriptions   Medication Sig Dispense Refill    Ascorbic Acid (VITAMIN C) 100 MG CHEW Chew      Cholecalciferol (VITAMIN D3) 1000 units CAPS Take 1 tablet by mouth daily      Glucosamine-Chondroit-Vit C-Mn (GLUCOSAMINE CHONDR 500 COMPLEX PO) Take by mouth       No current facility-administered medications for this visit  Allergies  No Known Allergies    Past Medical History, Social History, Family History, medications and allergies were reviewed  Vitals  Vitals:    02/01/18 0957   BP: 118/68   Pulse: 60   Weight: 73 9 kg (163 lb)   Height: 5' 8 5" (1 74 m)       Physical Exam  Physical Exam   Constitutional: He is oriented to person, place, and time  He appears well-developed and well-nourished  Cardiovascular: Normal rate  Pulmonary/Chest: Effort normal    Abdominal: Soft  Neurological: He is alert and oriented to person, place, and time  Skin: Skin is warm, dry and intact  Psychiatric: He has a normal mood and affect  Vitals reviewed          Results  Lab Results   Component Value Date    PSA 4 3 (H) 01/26/2018    PSA 5 4 (H) 12/05/2017    PSA 3 7 02/09/2016     Lab Results   Component Value Date    GLUCOSE 94 02/09/2016    CALCIUM 8 9 12/05/2017     12/05/2017 K 4 6 12/05/2017    CO2 29 12/05/2017     12/05/2017    BUN 18 12/05/2017    CREATININE 1 14 12/05/2017     Lab Results   Component Value Date    WBC 4 12 (L) 12/05/2017    HGB 15 5 12/05/2017    HCT 44 6 12/05/2017    MCV 92 12/05/2017     12/05/2017

## 2018-06-18 ENCOUNTER — TELEPHONE (OUTPATIENT)
Dept: FAMILY MEDICINE CLINIC | Facility: CLINIC | Age: 63
End: 2018-06-18

## 2018-06-19 ENCOUNTER — CLINICAL SUPPORT (OUTPATIENT)
Dept: FAMILY MEDICINE CLINIC | Facility: CLINIC | Age: 63
End: 2018-06-19
Payer: COMMERCIAL

## 2018-06-19 DIAGNOSIS — I10 BENIGN HYPERTENSION: Primary | ICD-10-CM

## 2018-06-19 DIAGNOSIS — E55.9 VITAMIN D DEFICIENCY: ICD-10-CM

## 2018-06-19 DIAGNOSIS — E78.5 HYPERLIPIDEMIA, UNSPECIFIED HYPERLIPIDEMIA TYPE: ICD-10-CM

## 2018-06-19 LAB
25(OH)D3 SERPL-MCNC: 25.7 NG/ML (ref 30–100)
ALBUMIN SERPL BCP-MCNC: 3.9 G/DL (ref 3.5–5)
ALP SERPL-CCNC: 99 U/L (ref 46–116)
ALT SERPL W P-5'-P-CCNC: 20 U/L (ref 12–78)
ANION GAP SERPL CALCULATED.3IONS-SCNC: 7 MMOL/L (ref 4–13)
AST SERPL W P-5'-P-CCNC: 15 U/L (ref 5–45)
BASOPHILS # BLD AUTO: 0.02 THOUSANDS/ΜL (ref 0–0.1)
BASOPHILS NFR BLD AUTO: 1 % (ref 0–1)
BILIRUB SERPL-MCNC: 0.64 MG/DL (ref 0.2–1)
BUN SERPL-MCNC: 19 MG/DL (ref 5–25)
CALCIUM SERPL-MCNC: 8.8 MG/DL (ref 8.3–10.1)
CHLORIDE SERPL-SCNC: 105 MMOL/L (ref 100–108)
CHOLEST SERPL-MCNC: 243 MG/DL (ref 50–200)
CO2 SERPL-SCNC: 28 MMOL/L (ref 21–32)
CREAT SERPL-MCNC: 1.2 MG/DL (ref 0.6–1.3)
EOSINOPHIL # BLD AUTO: 0.07 THOUSAND/ΜL (ref 0–0.61)
EOSINOPHIL NFR BLD AUTO: 2 % (ref 0–6)
ERYTHROCYTE [DISTWIDTH] IN BLOOD BY AUTOMATED COUNT: 12.9 % (ref 11.6–15.1)
GFR SERPL CREATININE-BSD FRML MDRD: 64 ML/MIN/1.73SQ M
GLUCOSE P FAST SERPL-MCNC: 98 MG/DL (ref 65–99)
HCT VFR BLD AUTO: 44.9 % (ref 36.5–49.3)
HDLC SERPL-MCNC: 50 MG/DL (ref 40–60)
HGB BLD-MCNC: 14.8 G/DL (ref 12–17)
IMM GRANULOCYTES # BLD AUTO: 0.01 THOUSAND/UL (ref 0–0.2)
IMM GRANULOCYTES NFR BLD AUTO: 0 % (ref 0–2)
LDLC SERPL CALC-MCNC: 164 MG/DL (ref 0–100)
LYMPHOCYTES # BLD AUTO: 1.15 THOUSANDS/ΜL (ref 0.6–4.47)
LYMPHOCYTES NFR BLD AUTO: 30 % (ref 14–44)
MCH RBC QN AUTO: 31.2 PG (ref 26.8–34.3)
MCHC RBC AUTO-ENTMCNC: 33 G/DL (ref 31.4–37.4)
MCV RBC AUTO: 95 FL (ref 82–98)
MONOCYTES # BLD AUTO: 0.35 THOUSAND/ΜL (ref 0.17–1.22)
MONOCYTES NFR BLD AUTO: 9 % (ref 4–12)
NEUTROPHILS # BLD AUTO: 2.19 THOUSANDS/ΜL (ref 1.85–7.62)
NEUTS SEG NFR BLD AUTO: 58 % (ref 43–75)
NRBC BLD AUTO-RTO: 0 /100 WBCS
PLATELET # BLD AUTO: 201 THOUSANDS/UL (ref 149–390)
PMV BLD AUTO: 10.3 FL (ref 8.9–12.7)
POTASSIUM SERPL-SCNC: 4.7 MMOL/L (ref 3.5–5.3)
PROT SERPL-MCNC: 7.1 G/DL (ref 6.4–8.2)
RBC # BLD AUTO: 4.74 MILLION/UL (ref 3.88–5.62)
SODIUM SERPL-SCNC: 140 MMOL/L (ref 136–145)
TRIGL SERPL-MCNC: 143 MG/DL
TSH SERPL DL<=0.05 MIU/L-ACNC: 1.47 UIU/ML (ref 0.36–3.74)
WBC # BLD AUTO: 3.79 THOUSAND/UL (ref 4.31–10.16)

## 2018-06-19 PROCEDURE — 80053 COMPREHEN METABOLIC PANEL: CPT | Performed by: FAMILY MEDICINE

## 2018-06-19 PROCEDURE — 80061 LIPID PANEL: CPT | Performed by: FAMILY MEDICINE

## 2018-06-19 PROCEDURE — 85025 COMPLETE CBC W/AUTO DIFF WBC: CPT | Performed by: FAMILY MEDICINE

## 2018-06-19 PROCEDURE — 82306 VITAMIN D 25 HYDROXY: CPT | Performed by: FAMILY MEDICINE

## 2018-06-19 PROCEDURE — 36415 COLL VENOUS BLD VENIPUNCTURE: CPT | Performed by: FAMILY MEDICINE

## 2018-06-19 PROCEDURE — 84443 ASSAY THYROID STIM HORMONE: CPT | Performed by: FAMILY MEDICINE

## 2018-06-26 ENCOUNTER — TELEPHONE (OUTPATIENT)
Dept: FAMILY MEDICINE CLINIC | Facility: CLINIC | Age: 63
End: 2018-06-26

## 2018-06-26 NOTE — TELEPHONE ENCOUNTER
Pt's spouse consent ok states she prefers you look at her spouse's blood work  They would like to schedule their physical in December do you need to see him for an  appointment to review blood work in detail? Can you please let them know thank you

## 2018-06-28 NOTE — TELEPHONE ENCOUNTER
Call pt, let him know that his FBW shows that his FBS, liver and kidney function is normal  Total chol and LDL have gotten slightly worse but his trigs have improved and are now WNL and his HDL remains WNL  His Vit D has improved but still a bit low  I would rec he double his current Vit D supplement  Regarding his lipids, find out if he is on Red Yeast Rice, fish oil, or both  If he is not on Red Yeast Rice, then I would rec taking 1200 mg daily OTC with a meal  Rest of labs look okay  He needs to continue to try to improve his diet as well as exercise  He can F/U in December for PE with repeat FBW pre-appt  He does not need to F/U in the office for further in-depth discussion at this time

## 2018-06-29 DIAGNOSIS — E78.2 MIXED HYPERLIPIDEMIA: Primary | ICD-10-CM

## 2018-06-29 PROBLEM — E78.5 HYPERLIPIDEMIA: Status: ACTIVE | Noted: 2017-04-01

## 2018-06-29 RX ORDER — AMPICILLIN TRIHYDRATE 250 MG
2 CAPSULE ORAL DAILY
Qty: 60 CAPSULE | Refills: 5
Start: 2018-06-29

## 2018-06-29 NOTE — TELEPHONE ENCOUNTER
Pt's wife notified, she states Audi Turner has not been taking his Vitamin D, he's going to start back up and Wife will have him start Red Yeast Rice 1,200 units daily with a meal